# Patient Record
Sex: MALE
[De-identification: names, ages, dates, MRNs, and addresses within clinical notes are randomized per-mention and may not be internally consistent; named-entity substitution may affect disease eponyms.]

---

## 2024-07-19 ENCOUNTER — APPOINTMENT (OUTPATIENT)
Dept: GASTROENTEROLOGY | Facility: CLINIC | Age: 19
End: 2024-07-19
Payer: COMMERCIAL

## 2024-07-19 VITALS
DIASTOLIC BLOOD PRESSURE: 69 MMHG | WEIGHT: 114 LBS | OXYGEN SATURATION: 98 % | SYSTOLIC BLOOD PRESSURE: 103 MMHG | HEIGHT: 64 IN | RESPIRATION RATE: 14 BRPM | BODY MASS INDEX: 19.46 KG/M2 | HEART RATE: 88 BPM

## 2024-07-19 DIAGNOSIS — K51.90 ULCERATIVE COLITIS, UNSPECIFIED, W/OUT COMPLICATIONS: ICD-10-CM

## 2024-07-19 PROBLEM — Z00.00 ENCOUNTER FOR PREVENTIVE HEALTH EXAMINATION: Status: ACTIVE | Noted: 2024-07-19

## 2024-07-19 PROCEDURE — 99204 OFFICE O/P NEW MOD 45 MIN: CPT

## 2024-07-19 RX ORDER — PREDNISONE 10 MG/1
10 TABLET ORAL
Qty: 120 | Refills: 0 | Status: ACTIVE | COMMUNITY
Start: 2024-07-19 | End: 1900-01-01

## 2024-07-25 RX ADMIN — INFLIXIMAB 0 MG: 100 INJECTION, POWDER, LYOPHILIZED, FOR SOLUTION INTRAVENOUS at 00:00

## 2024-07-25 RX ADMIN — ACETAMINOPHEN 0 MG: 325 TABLET ORAL at 00:00

## 2024-07-25 RX ADMIN — CETIRIZINE HYDROCHLORIDE 1 MG: 10 TABLET, FILM COATED ORAL at 00:00

## 2024-07-30 ENCOUNTER — MED ADMIN CHARGE (OUTPATIENT)
Age: 19
End: 2024-07-30

## 2024-07-30 ENCOUNTER — APPOINTMENT (OUTPATIENT)
Dept: RHEUMATOLOGY | Facility: CLINIC | Age: 19
End: 2024-07-30
Payer: COMMERCIAL

## 2024-07-30 VITALS — OXYGEN SATURATION: 96 % | SYSTOLIC BLOOD PRESSURE: 94 MMHG | DIASTOLIC BLOOD PRESSURE: 58 MMHG | HEART RATE: 81 BPM

## 2024-07-30 VITALS
TEMPERATURE: 98 F | SYSTOLIC BLOOD PRESSURE: 127 MMHG | HEART RATE: 107 BPM | RESPIRATION RATE: 16 BRPM | DIASTOLIC BLOOD PRESSURE: 71 MMHG | WEIGHT: 114 LBS | OXYGEN SATURATION: 96 %

## 2024-07-30 PROCEDURE — 96413 CHEMO IV INFUSION 1 HR: CPT

## 2024-07-30 PROCEDURE — 96415 CHEMO IV INFUSION ADDL HR: CPT

## 2024-07-31 NOTE — HISTORY OF PRESENT ILLNESS
[Denies] : Denies [No] : No [N/A] : N/A [Yes] : Yes [Informed consent documented in EHR.] : Informed consent documented in EHR. [Left upper extremity] : Left upper extremity [24g] : 24g [Start Time: ___] : Medication Start Time: [unfilled] [End Time: ___] : Medication End Time: [unfilled] [Medication Name: ___] : Medication Name: [unfilled] [Total Amount Administered: ___] : Total Amount Administered: [unfilled] [IV discontinued. Intact. No signs or symptoms of IV complications noted. Time: ___] : IV discontinued. Intact. No signs or symptoms of IV complications noted. Time: [unfilled] [Patient  instructed to seek medical attention with signs and symptoms of adverse effects] : Patient  instructed to seek medical attention with signs and symptoms of adverse effects [Patient left unit in no acute distress] : Patient left unit in no acute distress [Medications administered as ordered and tolerated well.] : Medications administered as ordered and tolerated well. [de-identified] : FIRST DOSE [de-identified] : FOREARM [de-identified] : Patient presents for scheduled Remicade infusion, week 0, ambulatory in [facility], accompanied by mother. Patient with admits to recent diagnosis of UC, reporting 2-3 semi solid BM's during daylight hours and 7-9 BM's at nights. Patient adds abdominal pain and cramping with BM's at nights. Medication and consent reported as discussed with MD. Teaching and education reinforced with patient and mother, both verbalized understanding to all discussed.

## 2024-08-13 ENCOUNTER — APPOINTMENT (OUTPATIENT)
Dept: RHEUMATOLOGY | Facility: CLINIC | Age: 19
End: 2024-08-13
Payer: COMMERCIAL

## 2024-08-13 VITALS
TEMPERATURE: 98.2 F | OXYGEN SATURATION: 97 % | DIASTOLIC BLOOD PRESSURE: 70 MMHG | SYSTOLIC BLOOD PRESSURE: 108 MMHG | RESPIRATION RATE: 16 BRPM | HEART RATE: 88 BPM

## 2024-08-13 VITALS — DIASTOLIC BLOOD PRESSURE: 69 MMHG | HEART RATE: 89 BPM | SYSTOLIC BLOOD PRESSURE: 101 MMHG | OXYGEN SATURATION: 97 %

## 2024-08-13 DIAGNOSIS — K51.90 ULCERATIVE COLITIS, UNSPECIFIED, W/OUT COMPLICATIONS: ICD-10-CM

## 2024-08-13 PROCEDURE — 96415 CHEMO IV INFUSION ADDL HR: CPT

## 2024-08-13 PROCEDURE — 96413 CHEMO IV INFUSION 1 HR: CPT

## 2024-08-13 RX ORDER — CETIRIZINE HYDROCHLORIDE 10 MG/1
10 TABLET, FILM COATED ORAL
Qty: 0 | Refills: 0 | Status: COMPLETED | OUTPATIENT
Start: 2024-08-13

## 2024-08-13 RX ORDER — ACETAMINOPHEN 325 MG/1
325 TABLET ORAL
Qty: 0 | Refills: 0 | Status: COMPLETED | OUTPATIENT
Start: 2024-08-13

## 2024-08-13 RX ORDER — INFLIXIMAB 100 MG/10ML
100 INJECTION, POWDER, LYOPHILIZED, FOR SOLUTION INTRAVENOUS
Qty: 1 | Refills: 0 | Status: COMPLETED | OUTPATIENT
Start: 2024-08-13

## 2024-08-14 NOTE — HISTORY OF PRESENT ILLNESS
[N/A] : N/A [Denies] : Denies [No] : No [Yes] : Yes [Informed consent documented in EHR.] : Informed consent documented in EHR. [Left upper extremity] : Left upper extremity [24g] : 24g [Start Time: ___] : Medication Start Time: [unfilled] [End Time: ___] : Medication End Time: [unfilled] [Medication Name: ___] : Medication Name: [unfilled] [Total Amount Administered: ___] : Total Amount Administered: [unfilled] [IV discontinued. Intact. No signs or symptoms of IV complications noted. Time: ___] : IV discontinued. Intact. No signs or symptoms of IV complications noted. Time: [unfilled] [Patient  instructed to seek medical attention with signs and symptoms of adverse effects] : Patient  instructed to seek medical attention with signs and symptoms of adverse effects [Patient left unit in no acute distress] : Patient left unit in no acute distress [Medications administered as ordered and tolerated well.] : Medications administered as ordered and tolerated well. [de-identified] : FOREARM [de-identified] : Patient presents for scheduled Remicade infusion, week 2, ambulatory in [facility], accompanied by mother. Patient reports tolerating first dose well, with improvement in symptoms. Today patient admits to 3-5 semi soft BM's daily. Adds that he has had last 3 consecutive nights of uninterrupted sleep, with no BM's. Patient adds abdominal pain, and cramping has improved as well.

## 2024-08-14 NOTE — HISTORY OF PRESENT ILLNESS
[N/A] : N/A [Denies] : Denies [No] : No [Yes] : Yes [Informed consent documented in EHR.] : Informed consent documented in EHR. [Left upper extremity] : Left upper extremity [24g] : 24g [Start Time: ___] : Medication Start Time: [unfilled] [End Time: ___] : Medication End Time: [unfilled] [Medication Name: ___] : Medication Name: [unfilled] [Total Amount Administered: ___] : Total Amount Administered: [unfilled] [IV discontinued. Intact. No signs or symptoms of IV complications noted. Time: ___] : IV discontinued. Intact. No signs or symptoms of IV complications noted. Time: [unfilled] [Patient  instructed to seek medical attention with signs and symptoms of adverse effects] : Patient  instructed to seek medical attention with signs and symptoms of adverse effects [Patient left unit in no acute distress] : Patient left unit in no acute distress [Medications administered as ordered and tolerated well.] : Medications administered as ordered and tolerated well. [de-identified] : FOREARM [de-identified] : Patient presents for scheduled Remicade infusion, week 2, ambulatory in [facility], accompanied by mother. Patient reports tolerating first dose well, with improvement in symptoms. Today patient admits to 3-5 semi soft BM's daily. Adds that he has had last 3 consecutive nights of uninterrupted sleep, with no BM's. Patient adds abdominal pain, and cramping has improved as well.

## 2024-08-15 ENCOUNTER — RX RENEWAL (OUTPATIENT)
Age: 19
End: 2024-08-15

## 2024-09-13 ENCOUNTER — NON-APPOINTMENT (OUTPATIENT)
Age: 19
End: 2024-09-13

## 2024-09-14 ENCOUNTER — APPOINTMENT (OUTPATIENT)
Dept: RHEUMATOLOGY | Facility: CLINIC | Age: 19
End: 2024-09-14
Payer: COMMERCIAL

## 2024-09-14 VITALS
SYSTOLIC BLOOD PRESSURE: 88 MMHG | OXYGEN SATURATION: 98 % | RESPIRATION RATE: 16 BRPM | HEART RATE: 65 BPM | DIASTOLIC BLOOD PRESSURE: 59 MMHG

## 2024-09-14 VITALS
RESPIRATION RATE: 16 BRPM | TEMPERATURE: 98.3 F | HEART RATE: 73 BPM | DIASTOLIC BLOOD PRESSURE: 64 MMHG | SYSTOLIC BLOOD PRESSURE: 96 MMHG | OXYGEN SATURATION: 99 %

## 2024-09-14 DIAGNOSIS — K51.90 ULCERATIVE COLITIS, UNSPECIFIED, W/OUT COMPLICATIONS: ICD-10-CM

## 2024-09-14 PROCEDURE — 96413 CHEMO IV INFUSION 1 HR: CPT

## 2024-09-14 RX ORDER — CETIRIZINE HYDROCHLORIDE 10 MG/1
10 TABLET, FILM COATED ORAL
Qty: 0 | Refills: 0 | Status: COMPLETED | OUTPATIENT
Start: 2024-09-14

## 2024-09-14 RX ORDER — INFLIXIMAB 100 MG/10ML
100 INJECTION, POWDER, LYOPHILIZED, FOR SOLUTION INTRAVENOUS
Qty: 1 | Refills: 0 | Status: COMPLETED | OUTPATIENT
Start: 2024-09-14

## 2024-09-14 RX ORDER — ACETAMINOPHEN 325 MG/1
325 TABLET ORAL
Qty: 0 | Refills: 0 | Status: COMPLETED | OUTPATIENT
Start: 2024-09-14

## 2024-09-14 RX ADMIN — INFLIXIMAB 0 MG: 100 INJECTION, POWDER, LYOPHILIZED, FOR SOLUTION INTRAVENOUS at 00:00

## 2024-09-14 RX ADMIN — ACETAMINOPHEN 0 MG: 325 TABLET ORAL at 00:00

## 2024-09-14 RX ADMIN — LORATADINE 0 MG: 10 TABLET ORAL at 00:00

## 2024-09-14 NOTE — HISTORY OF PRESENT ILLNESS
[N/A] : N/A [Denies] : Denies [No] : No [Yes] : Yes [Declined] : Declined [Informed consent documented in EHR.] : Informed consent documented in EHR. [Left upper extremity] : Left upper extremity [24g] : 24g [Start Time: ___] : Medication Start Time: [unfilled] [End Time: ___] : Medication End Time: [unfilled] [Medication Name: ___] : Medication Name: [unfilled] [Total Amount Administered: ___] : Total Amount Administered: [unfilled] [IV discontinued. Intact. No signs or symptoms of IV complications noted. Time: ___] : IV discontinued. Intact. No signs or symptoms of IV complications noted. Time: [unfilled] [Patient  instructed to seek medical attention with signs and symptoms of adverse effects] : Patient  instructed to seek medical attention with signs and symptoms of adverse effects [Patient left unit in no acute distress] : Patient left unit in no acute distress [Medications administered as ordered and tolerated well.] : Medications administered as ordered and tolerated well. [de-identified] : FOREARM [de-identified] : Patient presents for scheduled Remicade infusion, week 6, doing well overall. Patient denies any recent infection, antibiotic use or hospitalizations. Patient admits to 2-5 semi soft BM's daily. Patient denies any abdominal pain, cramping or bloating. No other symptoms or concerns noted at this time. Patient pre-medicated and tolerated infusion well.

## 2024-09-20 ENCOUNTER — TRANSCRIPTION ENCOUNTER (OUTPATIENT)
Age: 19
End: 2024-09-20

## 2024-10-03 ENCOUNTER — APPOINTMENT (OUTPATIENT)
Dept: GASTROENTEROLOGY | Facility: CLINIC | Age: 19
End: 2024-10-03
Payer: COMMERCIAL

## 2024-10-03 VITALS
OXYGEN SATURATION: 99 % | BODY MASS INDEX: 19.63 KG/M2 | SYSTOLIC BLOOD PRESSURE: 90 MMHG | HEART RATE: 84 BPM | DIASTOLIC BLOOD PRESSURE: 60 MMHG | WEIGHT: 115 LBS | HEIGHT: 64 IN

## 2024-10-03 DIAGNOSIS — K51.90 ULCERATIVE COLITIS, UNSPECIFIED, W/OUT COMPLICATIONS: ICD-10-CM

## 2024-10-03 PROCEDURE — 99213 OFFICE O/P EST LOW 20 MIN: CPT

## 2024-10-03 PROCEDURE — G2211 COMPLEX E/M VISIT ADD ON: CPT | Mod: NC

## 2024-10-03 NOTE — HISTORY OF PRESENT ILLNESS
[FreeTextEntry1] : 19-year-old male Severe colitis Minimal response to corticosteroid Good initial response to infliximab Patient has completed induction Improved bowel habit, some slight weight gain No further bleeding Patient overall feels 75% improved  Still having up to 5 bowel movements a day Some straining, which is notably how his complaints began Improved stool formation however noted

## 2024-10-03 NOTE — REASON FOR VISIT
[Follow-up] : a follow-up of an existing diagnosis [Parent] : parent [FreeTextEntry1] : Ulcerative colitis

## 2024-10-03 NOTE — ASSESSMENT
[FreeTextEntry1] : Response but not remission following induction with infliximab Patient agreeable to blood work today including drug trough level with consideration to interval modification if needed Telephone follow-up next week.  Office visit again in 2 months.

## 2024-10-04 LAB
ALBUMIN SERPL ELPH-MCNC: 4.6 G/DL
ALP BLD-CCNC: 65 U/L
ALT SERPL-CCNC: 6 U/L
ANION GAP SERPL CALC-SCNC: 12 MMOL/L
AST SERPL-CCNC: 16 U/L
BILIRUB SERPL-MCNC: 0.2 MG/DL
BUN SERPL-MCNC: 16 MG/DL
CALCIUM SERPL-MCNC: 9.6 MG/DL
CHLORIDE SERPL-SCNC: 104 MMOL/L
CO2 SERPL-SCNC: 24 MMOL/L
CREAT SERPL-MCNC: 0.98 MG/DL
CRP SERPL-MCNC: <3 MG/L
EGFR: 113 ML/MIN/1.73M2
HCT VFR BLD CALC: 46.6 %
HGB BLD-MCNC: 15.4 G/DL
MCHC RBC-ENTMCNC: 30.1 PG
MCHC RBC-ENTMCNC: 33 GM/DL
MCV RBC AUTO: 91.2 FL
PLATELET # BLD AUTO: 403 K/UL
POTASSIUM SERPL-SCNC: 4.5 MMOL/L
PROT SERPL-MCNC: 7.7 G/DL
RBC # BLD: 5.11 M/UL
RBC # FLD: 11.7 %
SODIUM SERPL-SCNC: 141 MMOL/L
WBC # FLD AUTO: 7.94 K/UL

## 2024-10-18 DIAGNOSIS — K51.90 ULCERATIVE COLITIS, UNSPECIFIED, W/OUT COMPLICATIONS: ICD-10-CM

## 2024-10-24 ENCOUNTER — NON-APPOINTMENT (OUTPATIENT)
Age: 19
End: 2024-10-24

## 2024-10-24 RX ORDER — INFLIXIMAB 100 MG/10ML
100 INJECTION, POWDER, LYOPHILIZED, FOR SOLUTION INTRAVENOUS
Refills: 0 | Status: ACTIVE | OUTPATIENT
Start: 2024-10-24 | End: 1900-01-01

## 2024-10-28 ENCOUNTER — EMERGENCY (EMERGENCY)
Facility: HOSPITAL | Age: 19
LOS: 1 days | End: 2024-10-28
Attending: STUDENT IN AN ORGANIZED HEALTH CARE EDUCATION/TRAINING PROGRAM
Payer: COMMERCIAL

## 2024-10-28 ENCOUNTER — NON-APPOINTMENT (OUTPATIENT)
Age: 19
End: 2024-10-28

## 2024-10-28 VITALS
HEIGHT: 64 IN | WEIGHT: 110.01 LBS | DIASTOLIC BLOOD PRESSURE: 77 MMHG | TEMPERATURE: 98 F | SYSTOLIC BLOOD PRESSURE: 119 MMHG | HEART RATE: 90 BPM | RESPIRATION RATE: 16 BRPM | OXYGEN SATURATION: 97 %

## 2024-10-28 LAB
ALBUMIN SERPL ELPH-MCNC: 4.2 G/DL — SIGNIFICANT CHANGE UP (ref 3.3–5)
ALP SERPL-CCNC: 76 U/L — SIGNIFICANT CHANGE UP (ref 40–120)
ALT FLD-CCNC: <5 U/L — LOW (ref 10–45)
ANION GAP SERPL CALC-SCNC: 12 MMOL/L — SIGNIFICANT CHANGE UP (ref 5–17)
AST SERPL-CCNC: 13 U/L — SIGNIFICANT CHANGE UP (ref 10–40)
BILIRUB SERPL-MCNC: 0.4 MG/DL — SIGNIFICANT CHANGE UP (ref 0.2–1.2)
BUN SERPL-MCNC: 12 MG/DL — SIGNIFICANT CHANGE UP (ref 7–23)
CALCIUM SERPL-MCNC: 9.2 MG/DL — SIGNIFICANT CHANGE UP (ref 8.4–10.5)
CHLORIDE SERPL-SCNC: 103 MMOL/L — SIGNIFICANT CHANGE UP (ref 96–108)
CO2 SERPL-SCNC: 23 MMOL/L — SIGNIFICANT CHANGE UP (ref 22–31)
CREAT SERPL-MCNC: 0.99 MG/DL — SIGNIFICANT CHANGE UP (ref 0.5–1.3)
CRP SERPL-MCNC: 27 MG/L — HIGH (ref 0–4)
EGFR: 112 ML/MIN/1.73M2 — SIGNIFICANT CHANGE UP
GLUCOSE SERPL-MCNC: 87 MG/DL — SIGNIFICANT CHANGE UP (ref 70–99)
HCT VFR BLD CALC: 45.1 % — SIGNIFICANT CHANGE UP (ref 39–50)
HGB BLD-MCNC: 15 G/DL — SIGNIFICANT CHANGE UP (ref 13–17)
MCHC RBC-ENTMCNC: 29.4 PG — SIGNIFICANT CHANGE UP (ref 27–34)
MCHC RBC-ENTMCNC: 33.3 GM/DL — SIGNIFICANT CHANGE UP (ref 32–36)
MCV RBC AUTO: 88.4 FL — SIGNIFICANT CHANGE UP (ref 80–100)
NRBC # BLD: 0 /100 WBCS — SIGNIFICANT CHANGE UP (ref 0–0)
PLATELET # BLD AUTO: 426 K/UL — HIGH (ref 150–400)
POTASSIUM SERPL-MCNC: 3.9 MMOL/L — SIGNIFICANT CHANGE UP (ref 3.5–5.3)
POTASSIUM SERPL-SCNC: 3.9 MMOL/L — SIGNIFICANT CHANGE UP (ref 3.5–5.3)
PROT SERPL-MCNC: 7.6 G/DL — SIGNIFICANT CHANGE UP (ref 6–8.3)
RBC # BLD: 5.1 M/UL — SIGNIFICANT CHANGE UP (ref 4.2–5.8)
RBC # FLD: 11.7 % — SIGNIFICANT CHANGE UP (ref 10.3–14.5)
SODIUM SERPL-SCNC: 138 MMOL/L — SIGNIFICANT CHANGE UP (ref 135–145)
WBC # BLD: 11.96 K/UL — HIGH (ref 3.8–10.5)
WBC # FLD AUTO: 11.96 K/UL — HIGH (ref 3.8–10.5)

## 2024-10-28 PROCEDURE — 82397 CHEMILUMINESCENT ASSAY: CPT

## 2024-10-28 PROCEDURE — 99285 EMERGENCY DEPT VISIT HI MDM: CPT

## 2024-10-28 PROCEDURE — 86140 C-REACTIVE PROTEIN: CPT

## 2024-10-28 PROCEDURE — 80053 COMPREHEN METABOLIC PANEL: CPT

## 2024-10-28 PROCEDURE — 80230 DRUG ASSAY INFLIXIMAB: CPT

## 2024-10-28 PROCEDURE — 85027 COMPLETE CBC AUTOMATED: CPT

## 2024-10-28 PROCEDURE — 96374 THER/PROPH/DIAG INJ IV PUSH: CPT

## 2024-10-28 PROCEDURE — 99284 EMERGENCY DEPT VISIT MOD MDM: CPT | Mod: 25

## 2024-10-28 PROCEDURE — 85652 RBC SED RATE AUTOMATED: CPT

## 2024-10-28 PROCEDURE — 99283 EMERGENCY DEPT VISIT LOW MDM: CPT | Mod: GC

## 2024-10-28 RX ORDER — SODIUM CHLORIDE 0.9 % (FLUSH) 0.9 %
1000 SYRINGE (ML) INJECTION ONCE
Refills: 0 | Status: COMPLETED | OUTPATIENT
Start: 2024-10-28 | End: 2024-10-28

## 2024-10-28 RX ORDER — INFLIXIMAB-DYYB 120 MG/ML
500 INJECTION SUBCUTANEOUS ONCE
Refills: 0 | Status: COMPLETED | OUTPATIENT
Start: 2024-10-28 | End: 2024-10-28

## 2024-10-28 RX ADMIN — INFLIXIMAB-DYYB 125 MILLIGRAM(S): 120 INJECTION SUBCUTANEOUS at 21:41

## 2024-10-28 RX ADMIN — Medication 1000 MILLILITER(S): at 21:12

## 2024-10-28 NOTE — ED PROVIDER NOTE - CLINICAL SUMMARY MEDICAL DECISION MAKING FREE TEXT BOX
19-year-old male recently diagnosed with ulcerative colitis, presents to the ED complaining of 2 to 3 weeks of increased volume of stool and generalized weakness. Patient reports bloody stools occurring frequently up to 10 times per day for the past 2 weeks.  Patient denies fevers, chills, recent travel, strange or abnormal foods, antibiotic use.  Patient denies sick contacts with similar symptoms.  Patient currently on infliximab injections. Was taken off prednisone last week. Denies fever, chills, chest pain, shortness of breath, abdominal pain, n/v, dysuria.    Physical Exam:  Gen: NAD, awake and alert, non-toxic appearing  HEENT: Atraumatic, oropharynx clear, moist mucous membranes, normal conjunctiva  Cardio: RRR, no murmurs, rubs or gallops  Lung: CTAB, no respiratory distress, no wheezes/rhonchi/rales B/L  Abd: soft, NT, ND, no guarding, no rigidity, no rebound tenderness  MSK: no visible deformities, ROMx4   Neuro: No focal sensory or motor deficits  Skin: Warm, well perfused, no rash, no leg swelling     Patient presents with bloody diarrhea in setting of ulcerative colitis.  No abdominal pain And exam is nontender, low suspicion for intra-abdominal infectious process. Vitals within normal limits, nontoxic-appearing.  Considering dehydration, EZEQUIEL, ulcerative colitis exacerbation.  CBC, CMP To assess electrolyte abnormality.  Will consult GI  GI saw and evaluated patient, recommend infusion of apixaban here in the emergency department can be safely discharged with GI follow-up in 2 days

## 2024-10-28 NOTE — CONSULT NOTE ADULT - SUBJECTIVE AND OBJECTIVE BOX
Chief Complaint:  Patient is a 19y old  Male who presents with a chief complaint of diarrhea.     HPI:  Mr. Grissom is a 19 year old male, recently diagnosed ulcerative colitis disease, presenting with two weeks or worsening diarrhea and weight loss. He has been receiving infliximab infusions for his Crohn's, and had his third infusion at the beginning of October. Bowel movements were well controlled prior to the past two weeks, with 1-2 formed BM/day. For the past two weeks has been having many watery bloody stools daily. Denies any recent travel, sick contacts. No fevers, rashes, eye complaints, nausea, vomiting. Occasional abdominal pain/ cramping (typically when having a bm). Was away at [facility] in [place] when this started, came home due to worsening diarrhea and weight loss.   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 Prescribed budesonide 9mg, mesalamine 1.5g, and pantoprazole 20mg. Was started on steroid taper at that time, and also had first infliximab infusion. Says prednisone helped a lot with symptoms, most recent steroid course finished at the end of september.   ?     Allergies:  amoxicillin (Hives)      Home Medications:    Hospital Medications:      PMHX/PSHX:      Family history:      Denies family history of colon cancer/polyps, stomach cancer/polyps, pancreatic cancer/masses, liver cancer/disease, ovarian cancer and endometrial cancer.    Social History:   Tob: Denies  EtOH: Denies  Illicit Drugs: Denies    ROS:     General:  No wt loss, fevers, chills, night sweats, fatigue  Eyes:  Good vision, no reported pain  ENT:  No sore throat, pain, runny nose, dysphagia  CV:  No pain, palpitations, hypo/hypertension  Pulm:  No dyspnea, cough, tachypnea, wheezing  GI:  see HPI  :  No pain, bleeding, incontinence, nocturia  Muscle:  No pain, weakness  Neuro:  No weakness, tingling, memory problems  Psych:  No fatigue, insomnia, mood problems, depression  Endocrine:  No polyuria, polydipsia, cold/heat intolerance  Heme:  No petechiae, ecchymosis, easy bruisability  Skin:  No rash, tattoos, scars, edema    PHYSICAL EXAM:     GENERAL:  No acute distress  HEENT:  NCAT, no scleral icterus   CHEST:  no respiratory distress  HEART:  Regular rate and rhythm  ABDOMEN:  Soft, non-tender, non-distended, normoactive bowel sounds,  no masses, no hepato-splenomegaly, no signs of chronic liver disease  EXTREMITIES: No edema  SKIN:  No rash/erythema/ecchymoses/petechiae/wounds/abscess/warm/dry  NEURO:  Alert and oriented x 3, no asterixis    Vital Signs:  Vital Signs Last 24 Hrs  T(C): 36.8 (28 Oct 2024 14:22), Max: 36.8 (28 Oct 2024 14:22)  T(F): 98.2 (28 Oct 2024 14:22), Max: 98.2 (28 Oct 2024 14:22)  HR: 90 (28 Oct 2024 14:22) (90 - 90)  BP: 119/77 (28 Oct 2024 14:22) (119/77 - 119/77)  BP(mean): --  RR: 16 (28 Oct 2024 14:22) (16 - 16)  SpO2: 97% (28 Oct 2024 14:22) (97% - 97%)    Parameters below as of 28 Oct 2024 14:22  Patient On (Oxygen Delivery Method): room air      Daily Height in cm: 162.56 (28 Oct 2024 14:22)    Daily     LABS:                        Imaging:           Chief Complaint:  Patient is a 19y old  Male who presents with a chief complaint of diarrhea.     HPI:  Mr. Grissom is a 19 year old male, recently diagnosed ulcerative colitis disease, presenting with two weeks or worsening diarrhea and weight loss. He has been receiving infliximab infusions for his Crohn's, and had his third infusion at the beginning of October. Bowel movements were well controlled prior to the past two weeks, with 1-2 formed BM/day. For the past two weeks has been having many watery bloody stools daily. Denies any recent travel, sick contacts. No fevers, rashes, eye complaints, nausea, vomiting. Occasional abdominal pain/ cramping (typically when having a bm). Was away at college in [place] when this started, came home due to worsening diarrhea and weight loss. No joint pain/skin lesions/eye pain/fever. No sick contacts. He has recently received 3 doses of infliximab induction, last was beginning of October this month. He reports some improvement in symptoms, but in the last 2 weeks symptoms have returned.  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 Prescribed budesonide 9mg, mesalamine 1.5g, and pantoprazole 20mg. Was started on steroid taper at that time, and also had first infliximab infusion. Says prednisone helped a lot with symptoms, most recent steroid course finished at the end of september.   ?     Allergies:  amoxicillin (Hives)      Home Medications:    Hospital Medications:      PMHX/PSHX:      Family history:      Denies family history of colon cancer/polyps, stomach cancer/polyps, pancreatic cancer/masses, liver cancer/disease, ovarian cancer and endometrial cancer.    Social History:   Tob: Denies  EtOH: Denies  Illicit Drugs: Denies    ROS:     General:  No wt loss, fevers, chills, night sweats, fatigue  Eyes:  Good vision, no reported pain  ENT:  No sore throat, pain, runny nose, dysphagia  CV:  No pain, palpitations, hypo/hypertension  Pulm:  No dyspnea, cough, tachypnea, wheezing  GI:  see HPI  :  No pain, bleeding, incontinence, nocturia  Muscle:  No pain, weakness  Neuro:  No weakness, tingling, memory problems  Psych:  No fatigue, insomnia, mood problems, depression  Endocrine:  No polyuria, polydipsia, cold/heat intolerance  Heme:  No petechiae, ecchymosis, easy bruisability  Skin:  No rash, tattoos, scars, edema    PHYSICAL EXAM:     GENERAL:  No acute distress  HEENT:  NCAT, no scleral icterus   CHEST:  no respiratory distress  HEART:  Regular rate and rhythm  ABDOMEN:  Soft, non-tender, non-distended, normoactive bowel sounds,  no masses, no hepato-splenomegaly, no signs of chronic liver disease  EXTREMITIES: No edema  SKIN:  No rash/erythema/ecchymoses/petechiae/wounds/abscess/warm/dry  NEURO:  Alert and oriented x 3, no asterixis    Vital Signs:  Vital Signs Last 24 Hrs  T(C): 36.8 (28 Oct 2024 14:22), Max: 36.8 (28 Oct 2024 14:22)  T(F): 98.2 (28 Oct 2024 14:22), Max: 98.2 (28 Oct 2024 14:22)  HR: 90 (28 Oct 2024 14:22) (90 - 90)  BP: 119/77 (28 Oct 2024 14:22) (119/77 - 119/77)  BP(mean): --  RR: 16 (28 Oct 2024 14:22) (16 - 16)  SpO2: 97% (28 Oct 2024 14:22) (97% - 97%)    Parameters below as of 28 Oct 2024 14:22  Patient On (Oxygen Delivery Method): room air      Daily Height in cm: 162.56 (28 Oct 2024 14:22)    Daily     LABS:                        Imaging:

## 2024-10-28 NOTE — ED PROVIDER NOTE - PATIENT PORTAL LINK FT
You can access the FollowMyHealth Patient Portal offered by [facility] by registering at the following website: [email or web address]. By joining [facility]’s FollowMyHealth portal, you will also be able to view your health information using other applications (apps) compatible with our system.

## 2024-10-28 NOTE — CONSULT NOTE ADULT - ATTENDING COMMENTS
Agree with above. Patient with severe UC, has had 3 doses of induction infliximab, but still with diarrhea and pain. Suspect non-responding colitis to infliximab, but need to rule out C diff. Would draw infliximab drug level, check C diff, and admit patient to hospital. Will give additional accelerated dose of infliximab at 10mg/kg as soon as patient is admitted. Risks of biologic therapy, including that of allergic reactions, malignancy, infections explained, patient is agreeable to proceed with accelerated infliximab dose. D/w Dr. Garcia, patient's outpatient gastroenterologist.

## 2024-10-28 NOTE — ED PROVIDER NOTE - RAPID ASSESSMENT
19-year-old male recently diagnosed with ulcerative colitis presents to the ED complaining of 2 to 3 weeks of increased volume of stool.  Patient reports bloody stools occurring frequently up to 10 times per day for the past 2 weeks.  Patient denies fevers, chills, recent travel, strange or abnormal foods, antibiotic use.  Patient denies sick contacts with similar symptoms.  Patient currently on infliximab.    A GI consult note was appreciated requesting GI PCR and stool C. difficile be sent.  Given these orders, the case was discussed with charge RN and decision was made to place patient into isolation room in the interim.    Patient was seen as a rapid assessment (QPA) patient. This is an incomplete workup and it is intended that the patient will be seen in the main emergency department. The patient will be seen and further worked up in the main emergency department and their care will be completed by the main emergency department team along with a thorough physical exam. Receiving team will follow up on labs, analgesia, any clinical imaging, reassess and disposition as clinically indicated, all decisions regarding the progression of care will be made at their discretion. - Bo Roe PA-C 19-year-old male recently diagnosed with ulcerative colitis presents to the ED complaining of 2 to 3 weeks of increased volume of stool.  Patient reports bloody stools occurring frequently up to 10 times per day for the past 2 weeks.  Patient denies fevers, chills, recent travel, strange or abnormal foods, antibiotic use.  Patient denies sick contacts with similar symptoms.  Patient currently on infliximab.    A GI consult note was appreciated requesting GI PCR and stool C. difficile be sent.  Given these orders, the case was discussed with charge RN and decision was made to place patient into isolation room in the interim.    Patient was seen as a rapid assessment (QPA) patient. This is an incomplete workup and it is intended that the patient will be seen in the main emergency department. The patient will be seen and further worked up in the main emergency department and their care will be completed by the main emergency department team along with a thorough physical exam. Receiving team will follow up on labs, analgesia, any clinical imaging, reassess and disposition as clinically indicated, all decisions regarding the progression of care will be made at their discretion. - Bo Roe PA-C    Attending MD Banks: This patient was seen and orders were placed by the PA as per our department's QPA model.  I was not consulted in regards to this patient although I was present and available in the Emergency Department to the PA.  Patient was to be sent to main ED for full medical evaluation and receiving team was to follow up on any labs, analgesia, clinical imaging ordered by the PA.  Any reassessment and disposition decisions were to be made by receiving team as clinically indicated, all decisions regarding the progression of care to be made at their discretion.  I did not perform a comprehensive history and physical on this patient.

## 2024-10-28 NOTE — CONSULT NOTE ADULT - ASSESSMENT
19 year old male, PMH newly diagnosed UC, presenting with two weeks of worsening diarrhea and weight loss. Will need to rule out infectious etiologies causing a UC flare.     #Ulcerative colitis   Recommendations:    -please obtain Cdiff, GI PCR, fecal calprotectin   -obtain CBC, CMP, ESR, CRP, infliximab trough level   -will give infliximab 10mg/kg dose (500mg)     Note incomplete until finalized by attending signature/attestation.    Kimberli Banks  GI/Hepatology Fellow    MONDAY-FRIDAY 8AM-5PM:  Pager# [phone] ([facility]) or [phone] ([facility])    NON-URGENT CONSULTS:  Please email [email or web address]  AT NIGHT AND ON WEEKENDS:  Contact on-call GI fellow from 5pm-8am and on weekends/holidays   19 year old male, PMH newly diagnosed UC, presenting with two weeks of worsening diarrhea and weight loss. Will need to rule out infectious etiologies causing a UC flare.     #Ulcerative colitis     Recommendations:    -please obtain Cdiff, GI PCR, fecal calprotectin   -obtain CBC, CMP, ESR, CRP, infliximab trough level   -will give infliximab 10mg/kg dose (500mg x 1) with Tylenol and Benadryl x 1 as pre-medication   -D/w Dr. Garcia    Note incomplete until finalized by attending signature/attestation.    Kimberli Banks  GI/Hepatology Fellow    MONDAY-FRIDAY 8AM-5PM:  Pager# [phone] ([facility]) or [phone] ([facility])    NON-URGENT CONSULTS:  Please email [email or web address]  AT NIGHT AND ON WEEKENDS:  Contact on-call GI fellow from 5pm-8am and on weekends/holidays

## 2024-10-28 NOTE — ED PROVIDER NOTE - NSFOLLOWUPINSTRUCTIONS_ED_ALL_ED_FT
You were evaluated in the emergency department for diarrhea.  Gastroenterology saw and evaluated you and recommended infliximab infusion here.  Your labs were unremarkable here.    Please follow-up with your gastroenterologist within the next week.    Return to the emergency department for any new or concerning symptoms

## 2024-10-28 NOTE — ED PROVIDER NOTE - PROGRESS NOTE DETAILS
Dr. Rosey Yost, ATTENDING: Reassessed patient at bedside.  Vitals within normal limits.  Infusion complete. Patient tolerating p.o. and would like to go home.  Strict return precaution discussed and all questions answered

## 2024-10-28 NOTE — ED ADULT NURSE NOTE - OBJECTIVE STATEMENT
19 year old male presented to ED with c/o of frequent bowel movements x3 weeks s/p diagnosis of ulcerative colitis. pt denies CP, SOB, nausea/vomiting, numbness/tingling, fever, cough, chills, dizziness, headache, blurred vision, neuro intact. pt a&ox3, lung sounds clear, heart rate regular, abdomen soft nontender nondistended to palp. skin intact. IV in RAC 20G and patent. Will continue to monitor and assess while offering support and reassurance.

## 2024-10-29 VITALS
OXYGEN SATURATION: 98 % | HEART RATE: 75 BPM | RESPIRATION RATE: 18 BRPM | DIASTOLIC BLOOD PRESSURE: 62 MMHG | TEMPERATURE: 98 F | SYSTOLIC BLOOD PRESSURE: 114 MMHG

## 2024-10-29 LAB — ERYTHROCYTE [SEDIMENTATION RATE] IN BLOOD: 25 MM/HR — HIGH (ref 0–15)

## 2024-11-08 ENCOUNTER — APPOINTMENT (OUTPATIENT)
Dept: RHEUMATOLOGY | Facility: CLINIC | Age: 19
End: 2024-11-08

## 2024-11-26 ENCOUNTER — APPOINTMENT (OUTPATIENT)
Dept: GASTROENTEROLOGY | Facility: CLINIC | Age: 19
End: 2024-11-26
Payer: COMMERCIAL

## 2024-11-26 VITALS
HEIGHT: 64 IN | DIASTOLIC BLOOD PRESSURE: 56 MMHG | HEART RATE: 82 BPM | SYSTOLIC BLOOD PRESSURE: 110 MMHG | BODY MASS INDEX: 18.44 KG/M2 | WEIGHT: 108 LBS | OXYGEN SATURATION: 97 %

## 2024-11-26 DIAGNOSIS — K51.90 ULCERATIVE COLITIS, UNSPECIFIED, W/OUT COMPLICATIONS: ICD-10-CM

## 2024-11-26 PROCEDURE — 99213 OFFICE O/P EST LOW 20 MIN: CPT

## 2024-11-26 PROCEDURE — G2211 COMPLEX E/M VISIT ADD ON: CPT | Mod: NC

## 2024-11-26 RX ORDER — UPADACITINIB 45 MG/1
45 TABLET, EXTENDED RELEASE ORAL
Qty: 1 | Refills: 1 | Status: ACTIVE | COMMUNITY
Start: 2024-11-26 | End: 1900-01-01

## 2024-11-27 ENCOUNTER — APPOINTMENT (OUTPATIENT)
Dept: COLORECTAL SURGERY | Facility: CLINIC | Age: 19
End: 2024-11-27

## 2024-12-09 ENCOUNTER — TRANSCRIPTION ENCOUNTER (OUTPATIENT)
Age: 19
End: 2024-12-09

## 2024-12-09 ENCOUNTER — NON-APPOINTMENT (OUTPATIENT)
Age: 19
End: 2024-12-09

## 2024-12-10 ENCOUNTER — NON-APPOINTMENT (OUTPATIENT)
Age: 19
End: 2024-12-10

## 2025-01-03 ENCOUNTER — APPOINTMENT (OUTPATIENT)
Dept: RHEUMATOLOGY | Facility: CLINIC | Age: 20
End: 2025-01-03

## 2025-01-10 ENCOUNTER — APPOINTMENT (OUTPATIENT)
Dept: GASTROENTEROLOGY | Facility: CLINIC | Age: 20
End: 2025-01-10
Payer: COMMERCIAL

## 2025-01-10 VITALS
HEIGHT: 64 IN | DIASTOLIC BLOOD PRESSURE: 71 MMHG | SYSTOLIC BLOOD PRESSURE: 103 MMHG | OXYGEN SATURATION: 98 % | BODY MASS INDEX: 20.49 KG/M2 | WEIGHT: 120 LBS | HEART RATE: 91 BPM

## 2025-01-10 DIAGNOSIS — K51.90 ULCERATIVE COLITIS, UNSPECIFIED, W/OUT COMPLICATIONS: ICD-10-CM

## 2025-01-10 LAB
ALBUMIN SERPL ELPH-MCNC: 4.8 G/DL
ALP BLD-CCNC: 67 U/L
ALT SERPL-CCNC: 11 U/L
ANION GAP SERPL CALC-SCNC: 11 MMOL/L
AST SERPL-CCNC: 22 U/L
BILIRUB SERPL-MCNC: 0.3 MG/DL
BUN SERPL-MCNC: 18 MG/DL
CALCIUM SERPL-MCNC: 9.6 MG/DL
CHLORIDE SERPL-SCNC: 106 MMOL/L
CO2 SERPL-SCNC: 24 MMOL/L
CREAT SERPL-MCNC: 0.97 MG/DL
EGFR: 115 ML/MIN/1.73M2
HCT VFR BLD CALC: 42.2 %
HGB BLD-MCNC: 14.5 G/DL
MCHC RBC-ENTMCNC: 30.2 PG
MCHC RBC-ENTMCNC: 34.4 G/DL
MCV RBC AUTO: 87.9 FL
PLATELET # BLD AUTO: 379 K/UL
POTASSIUM SERPL-SCNC: 4.2 MMOL/L
PROT SERPL-MCNC: 7.7 G/DL
RBC # BLD: 4.8 M/UL
RBC # FLD: 12.5 %
SODIUM SERPL-SCNC: 141 MMOL/L
WBC # FLD AUTO: 5.37 K/UL

## 2025-01-10 PROCEDURE — 99213 OFFICE O/P EST LOW 20 MIN: CPT

## 2025-01-10 PROCEDURE — G2211 COMPLEX E/M VISIT ADD ON: CPT | Mod: NC

## 2025-03-13 ENCOUNTER — NON-APPOINTMENT (OUTPATIENT)
Age: 20
End: 2025-03-13

## 2025-03-21 ENCOUNTER — RESULT REVIEW (OUTPATIENT)
Age: 20
End: 2025-03-21

## 2025-03-21 ENCOUNTER — TRANSCRIPTION ENCOUNTER (OUTPATIENT)
Age: 20
End: 2025-03-21

## 2025-03-21 ENCOUNTER — OUTPATIENT (OUTPATIENT)
Dept: EMERGENCY DEPT | Facility: HOSPITAL | Age: 20
LOS: 1 days | Discharge: HOME | End: 2025-03-21
Payer: COMMERCIAL

## 2025-03-21 VITALS
DIASTOLIC BLOOD PRESSURE: 86 MMHG | TEMPERATURE: 98 F | OXYGEN SATURATION: 98 % | SYSTOLIC BLOOD PRESSURE: 122 MMHG | RESPIRATION RATE: 13 BRPM | HEART RATE: 88 BPM

## 2025-03-21 VITALS
OXYGEN SATURATION: 98 % | WEIGHT: 127.21 LBS | DIASTOLIC BLOOD PRESSURE: 97 MMHG | HEART RATE: 94 BPM | RESPIRATION RATE: 18 BRPM | SYSTOLIC BLOOD PRESSURE: 131 MMHG | TEMPERATURE: 98 F

## 2025-03-21 DIAGNOSIS — K35.80 UNSPECIFIED ACUTE APPENDICITIS: ICD-10-CM

## 2025-03-21 LAB
ABO RH CONFIRMATION: SIGNIFICANT CHANGE UP
ALBUMIN SERPL ELPH-MCNC: 3.9 G/DL — SIGNIFICANT CHANGE UP (ref 3.3–5)
ALBUMIN SERPL ELPH-MCNC: 3.9 G/DL — SIGNIFICANT CHANGE UP (ref 3.3–5)
ALP SERPL-CCNC: 61 U/L — SIGNIFICANT CHANGE UP (ref 40–120)
ALP SERPL-CCNC: 63 U/L — SIGNIFICANT CHANGE UP (ref 40–120)
ALT FLD-CCNC: 19 U/L — SIGNIFICANT CHANGE UP (ref 12–78)
ALT FLD-CCNC: 22 U/L — SIGNIFICANT CHANGE UP (ref 12–78)
ANION GAP SERPL CALC-SCNC: 3 MMOL/L — LOW (ref 5–17)
ANION GAP SERPL CALC-SCNC: 5 MMOL/L — SIGNIFICANT CHANGE UP (ref 5–17)
APTT BLD: 31 SEC — SIGNIFICANT CHANGE UP (ref 24.5–35.6)
AST SERPL-CCNC: 19 U/L — SIGNIFICANT CHANGE UP (ref 15–37)
AST SERPL-CCNC: 20 U/L — SIGNIFICANT CHANGE UP (ref 15–37)
BASOPHILS # BLD AUTO: 0.02 K/UL — SIGNIFICANT CHANGE UP (ref 0–0.2)
BASOPHILS NFR BLD AUTO: 0.2 % — SIGNIFICANT CHANGE UP (ref 0–2)
BILIRUB SERPL-MCNC: 0.5 MG/DL — SIGNIFICANT CHANGE UP (ref 0.2–1.2)
BILIRUB SERPL-MCNC: 0.5 MG/DL — SIGNIFICANT CHANGE UP (ref 0.2–1.2)
BUN SERPL-MCNC: 10 MG/DL — SIGNIFICANT CHANGE UP (ref 7–23)
BUN SERPL-MCNC: 9 MG/DL — SIGNIFICANT CHANGE UP (ref 7–23)
CALCIUM SERPL-MCNC: 9.1 MG/DL — SIGNIFICANT CHANGE UP (ref 8.5–10.1)
CALCIUM SERPL-MCNC: 9.2 MG/DL — SIGNIFICANT CHANGE UP (ref 8.5–10.1)
CHLORIDE SERPL-SCNC: 103 MMOL/L — SIGNIFICANT CHANGE UP (ref 96–108)
CHLORIDE SERPL-SCNC: 104 MMOL/L — SIGNIFICANT CHANGE UP (ref 96–108)
CO2 SERPL-SCNC: 25 MMOL/L — SIGNIFICANT CHANGE UP (ref 22–31)
CO2 SERPL-SCNC: 26 MMOL/L — SIGNIFICANT CHANGE UP (ref 22–31)
CREAT SERPL-MCNC: 0.83 MG/DL — SIGNIFICANT CHANGE UP (ref 0.5–1.3)
CREAT SERPL-MCNC: 0.95 MG/DL — SIGNIFICANT CHANGE UP (ref 0.5–1.3)
EGFR: 118 ML/MIN/1.73M2 — SIGNIFICANT CHANGE UP
EGFR: 118 ML/MIN/1.73M2 — SIGNIFICANT CHANGE UP
EGFR: 128 ML/MIN/1.73M2 — SIGNIFICANT CHANGE UP
EGFR: 128 ML/MIN/1.73M2 — SIGNIFICANT CHANGE UP
EOSINOPHIL # BLD AUTO: 0.01 K/UL — SIGNIFICANT CHANGE UP (ref 0–0.5)
EOSINOPHIL NFR BLD AUTO: 0.1 % — SIGNIFICANT CHANGE UP (ref 0–6)
GLUCOSE SERPL-MCNC: 104 MG/DL — HIGH (ref 70–99)
GLUCOSE SERPL-MCNC: 93 MG/DL — SIGNIFICANT CHANGE UP (ref 70–99)
HCT VFR BLD CALC: 41.1 % — SIGNIFICANT CHANGE UP (ref 39–50)
HCT VFR BLD CALC: 41.4 % — SIGNIFICANT CHANGE UP (ref 39–50)
HGB BLD-MCNC: 14 G/DL — SIGNIFICANT CHANGE UP (ref 13–17)
HGB BLD-MCNC: 14.5 G/DL — SIGNIFICANT CHANGE UP (ref 13–17)
IMM GRANULOCYTES # BLD AUTO: 0.04 K/UL — SIGNIFICANT CHANGE UP (ref 0–0.07)
IMM GRANULOCYTES NFR BLD AUTO: 0.4 % — SIGNIFICANT CHANGE UP (ref 0–0.9)
INR BLD: 1.25 RATIO — HIGH (ref 0.85–1.16)
LIDOCAIN IGE QN: 48 U/L — SIGNIFICANT CHANGE UP (ref 13–75)
LYMPHOCYTES # BLD AUTO: 1.8 K/UL — SIGNIFICANT CHANGE UP (ref 1–3.3)
LYMPHOCYTES NFR BLD AUTO: 16.3 % — SIGNIFICANT CHANGE UP (ref 13–44)
MCHC RBC-ENTMCNC: 31.3 PG — SIGNIFICANT CHANGE UP (ref 27–34)
MCHC RBC-ENTMCNC: 32 PG — SIGNIFICANT CHANGE UP (ref 27–34)
MCHC RBC-ENTMCNC: 34.1 G/DL — SIGNIFICANT CHANGE UP (ref 32–36)
MCHC RBC-ENTMCNC: 35 G/DL — SIGNIFICANT CHANGE UP (ref 32–36)
MCV RBC AUTO: 91.4 FL — SIGNIFICANT CHANGE UP (ref 80–100)
MCV RBC AUTO: 91.7 FL — SIGNIFICANT CHANGE UP (ref 80–100)
MONOCYTES # BLD AUTO: 0.52 K/UL — SIGNIFICANT CHANGE UP (ref 0–0.9)
MONOCYTES NFR BLD AUTO: 4.7 % — SIGNIFICANT CHANGE UP (ref 2–14)
NEUTROPHILS # BLD AUTO: 8.66 K/UL — HIGH (ref 1.8–7.4)
NEUTROPHILS NFR BLD AUTO: 78.3 % — HIGH (ref 43–77)
NRBC # BLD AUTO: 0 K/UL — SIGNIFICANT CHANGE UP (ref 0–0)
NRBC # BLD AUTO: 0 K/UL — SIGNIFICANT CHANGE UP (ref 0–0)
NRBC # FLD: 0 K/UL — SIGNIFICANT CHANGE UP (ref 0–0)
NRBC # FLD: 0 K/UL — SIGNIFICANT CHANGE UP (ref 0–0)
NRBC BLD AUTO-RTO: 0 /100 WBCS — SIGNIFICANT CHANGE UP (ref 0–0)
NRBC BLD AUTO-RTO: 0 /100 WBCS — SIGNIFICANT CHANGE UP (ref 0–0)
PLATELET # BLD AUTO: 351 K/UL — SIGNIFICANT CHANGE UP (ref 150–400)
PLATELET # BLD AUTO: 369 K/UL — SIGNIFICANT CHANGE UP (ref 150–400)
PMV BLD: 8.6 FL — SIGNIFICANT CHANGE UP (ref 7–13)
PMV BLD: 8.8 FL — SIGNIFICANT CHANGE UP (ref 7–13)
POTASSIUM SERPL-MCNC: 3.6 MMOL/L — SIGNIFICANT CHANGE UP (ref 3.5–5.3)
POTASSIUM SERPL-MCNC: 4 MMOL/L — SIGNIFICANT CHANGE UP (ref 3.5–5.3)
POTASSIUM SERPL-SCNC: 3.6 MMOL/L — SIGNIFICANT CHANGE UP (ref 3.5–5.3)
POTASSIUM SERPL-SCNC: 4 MMOL/L — SIGNIFICANT CHANGE UP (ref 3.5–5.3)
PROT SERPL-MCNC: 7.7 GM/DL — SIGNIFICANT CHANGE UP (ref 6–8.3)
PROT SERPL-MCNC: 7.8 GM/DL — SIGNIFICANT CHANGE UP (ref 6–8.3)
PROTHROM AB SERPL-ACNC: 14.4 SEC — HIGH (ref 9.9–13.4)
RBC # BLD: 4.48 M/UL — SIGNIFICANT CHANGE UP (ref 4.2–5.8)
RBC # BLD: 4.53 M/UL — SIGNIFICANT CHANGE UP (ref 4.2–5.8)
RBC # FLD: 11.9 % — SIGNIFICANT CHANGE UP (ref 10.3–14.5)
RBC # FLD: 12 % — SIGNIFICANT CHANGE UP (ref 10.3–14.5)
SODIUM SERPL-SCNC: 133 MMOL/L — LOW (ref 135–145)
SODIUM SERPL-SCNC: 133 MMOL/L — LOW (ref 135–145)
WBC # BLD: 11.05 K/UL — HIGH (ref 3.8–10.5)
WBC # BLD: 11.92 K/UL — HIGH (ref 3.8–10.5)
WBC # FLD AUTO: 11.05 K/UL — HIGH (ref 3.8–10.5)
WBC # FLD AUTO: 11.92 K/UL — HIGH (ref 3.8–10.5)

## 2025-03-21 PROCEDURE — 80053 COMPREHEN METABOLIC PANEL: CPT

## 2025-03-21 PROCEDURE — 85610 PROTHROMBIN TIME: CPT

## 2025-03-21 PROCEDURE — 99285 EMERGENCY DEPT VISIT HI MDM: CPT

## 2025-03-21 PROCEDURE — 74177 CT ABD & PELVIS W/CONTRAST: CPT | Mod: 26

## 2025-03-21 PROCEDURE — 85730 THROMBOPLASTIN TIME PARTIAL: CPT

## 2025-03-21 PROCEDURE — 88304 TISSUE EXAM BY PATHOLOGIST: CPT

## 2025-03-21 PROCEDURE — 36415 COLL VENOUS BLD VENIPUNCTURE: CPT

## 2025-03-21 PROCEDURE — 86901 BLOOD TYPING SEROLOGIC RH(D): CPT

## 2025-03-21 PROCEDURE — C1889: CPT

## 2025-03-21 PROCEDURE — 86850 RBC ANTIBODY SCREEN: CPT

## 2025-03-21 PROCEDURE — 85027 COMPLETE CBC AUTOMATED: CPT

## 2025-03-21 PROCEDURE — 88304 TISSUE EXAM BY PATHOLOGIST: CPT | Mod: 26

## 2025-03-21 PROCEDURE — 86900 BLOOD TYPING SEROLOGIC ABO: CPT

## 2025-03-21 PROCEDURE — 44970 LAPAROSCOPY APPENDECTOMY: CPT

## 2025-03-21 RX ORDER — FENTANYL CITRATE-0.9 % NACL/PF 100MCG/2ML
50 SYRINGE (ML) INTRAVENOUS
Refills: 0 | Status: DISCONTINUED | OUTPATIENT
Start: 2025-03-21 | End: 2025-03-21

## 2025-03-21 RX ORDER — OXYCODONE HYDROCHLORIDE 30 MG/1
5 TABLET ORAL ONCE
Refills: 0 | Status: DISCONTINUED | OUTPATIENT
Start: 2025-03-21 | End: 2025-03-21

## 2025-03-21 RX ORDER — ONDANSETRON HCL/PF 4 MG/2 ML
4 VIAL (ML) INJECTION ONCE
Refills: 0 | Status: DISCONTINUED | OUTPATIENT
Start: 2025-03-21 | End: 2025-03-21

## 2025-03-21 RX ORDER — CIPROFLOXACIN HCL 250 MG
1 TABLET ORAL
Qty: 14 | Refills: 0
Start: 2025-03-21 | End: 2025-03-27

## 2025-03-21 RX ORDER — CIPROFLOXACIN HCL 250 MG
400 TABLET ORAL EVERY 12 HOURS
Refills: 0 | Status: DISCONTINUED | OUTPATIENT
Start: 2025-03-22 | End: 2025-03-21

## 2025-03-21 RX ORDER — METRONIDAZOLE 250 MG
500 TABLET ORAL ONCE
Refills: 0 | Status: DISCONTINUED | OUTPATIENT
Start: 2025-03-21 | End: 2025-03-21

## 2025-03-21 RX ORDER — CIPROFLOXACIN HCL 250 MG
400 TABLET ORAL ONCE
Refills: 0 | Status: DISCONTINUED | OUTPATIENT
Start: 2025-03-21 | End: 2025-03-21

## 2025-03-21 RX ORDER — ONDANSETRON HCL/PF 4 MG/2 ML
4 VIAL (ML) INJECTION ONCE
Refills: 0 | Status: COMPLETED | OUTPATIENT
Start: 2025-03-21 | End: 2025-03-21

## 2025-03-21 RX ORDER — OXYCODONE HYDROCHLORIDE AND ACETAMINOPHEN 10; 325 MG/1; MG/1
1 TABLET ORAL
Qty: 20 | Refills: 0
Start: 2025-03-21

## 2025-03-21 RX ORDER — ACETAMINOPHEN 500 MG/5ML
1000 LIQUID (ML) ORAL ONCE
Refills: 0 | Status: COMPLETED | OUTPATIENT
Start: 2025-03-21 | End: 2025-03-21

## 2025-03-21 RX ORDER — SODIUM CHLORIDE 9 G/1000ML
1000 INJECTION, SOLUTION INTRAVENOUS
Refills: 0 | Status: DISCONTINUED | OUTPATIENT
Start: 2025-03-21 | End: 2025-03-21

## 2025-03-21 RX ORDER — METRONIDAZOLE 250 MG
500 TABLET ORAL EVERY 8 HOURS
Refills: 0 | Status: DISCONTINUED | OUTPATIENT
Start: 2025-03-21 | End: 2025-03-21

## 2025-03-21 RX ORDER — BUPIVACAINE 13.3 MG/ML
20 INJECTION, SUSPENSION, LIPOSOMAL INFILTRATION ONCE
Refills: 0 | Status: DISCONTINUED | OUTPATIENT
Start: 2025-03-21 | End: 2025-03-21

## 2025-03-21 RX ADMIN — Medication 50 MICROGRAM(S): at 21:25

## 2025-03-21 RX ADMIN — Medication 50 MICROGRAM(S): at 20:47

## 2025-03-21 RX ADMIN — Medication 50 MICROGRAM(S): at 21:15

## 2025-03-21 RX ADMIN — Medication 50 MICROGRAM(S): at 21:12

## 2025-03-21 RX ADMIN — Medication 1000 MILLILITER(S): at 13:43

## 2025-03-21 RX ADMIN — Medication 50 MICROGRAM(S): at 20:06

## 2025-03-21 RX ADMIN — Medication 4 MILLIGRAM(S): at 13:43

## 2025-03-21 RX ADMIN — OXYCODONE HYDROCHLORIDE 5 MILLIGRAM(S): 30 TABLET ORAL at 20:06

## 2025-03-21 RX ADMIN — OXYCODONE HYDROCHLORIDE 5 MILLIGRAM(S): 30 TABLET ORAL at 21:12

## 2025-03-21 RX ADMIN — OXYCODONE HYDROCHLORIDE 5 MILLIGRAM(S): 30 TABLET ORAL at 21:15

## 2025-03-21 RX ADMIN — OXYCODONE HYDROCHLORIDE 5 MILLIGRAM(S): 30 TABLET ORAL at 21:24

## 2025-03-21 NOTE — ASU DISCHARGE PLAN (ADULT/PEDIATRIC) - FINANCIAL ASSISTANCE
[facility] provides services at a reduced cost to those who are determined to be eligible through [facility]’s financial assistance program. Information regarding [facility]’s financial assistance program can be found by going to [email or web address] or by calling [phone].

## 2025-03-21 NOTE — H&P ADULT - ASSESSMENT
18 yo M with acute appendicitis    - Admit under Dr. Rankin  - NPO  - ivf  - iv abx  - preop for lap appe today

## 2025-03-21 NOTE — H&P ADULT - HISTORY OF PRESENT ILLNESS
18 yo M, PMHx UC (on Remicade) presenting with 1 day abdominal pain. Pain is mostly in RLQ associated with n/v. Pt denies fever, chills, sob, chest pain.  CT done as outpatient concerning for acute appendicitis

## 2025-03-21 NOTE — CONSULT NOTE ADULT - SUBJECTIVE AND OBJECTIVE BOX
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 father who is a cardiologist has requested me for his surgery. patient has elevated wbc, abdominal pain for 24 hours. ctt shows enlarged appendix with fluid and inflammation consistent with appendicitis. recommend a laparoscopic appendectomy. discussed risk and benefits including but not limited to anethesia, bleeding, infections, sepsis, dvt, pe, mi, hernias, need for reoperations and death. patient and parents wwish to proceed.

## 2025-03-21 NOTE — ED STATDOCS - CLINICAL SUMMARY MEDICAL DECISION MAKING FREE TEXT BOX
19-year-old male presents to the emergency department with right lower abdominal pain.  Patient states he had some lightheadedness and nausea yesterday went to a emergency department by his college and had a CT scan which showed possible early tip appendicitis appendix with 7 mm in diameter.  Patient's father is a cardiologist in the [place] area spoke with colorectal team here and was told to come in for evaluation.  Upon arrival surgical team at bedside.  Recommending repeat CT scan.  Patient with minimal pain at this time.  When he does have some discomfort in the right lower quadrant.  No chest pain shortness of breath or other symptoms.  Exam is nonfocal will check labs CT as per surgery symptom control reassess.

## 2025-03-21 NOTE — ASU DISCHARGE PLAN (ADULT/PEDIATRIC) - CARE PROVIDER_API CALL
Toño Rankin  Colon/Rectal Surgery  [address], [facility] [place] [address]  Phone: [phone]  Fax: [phone]  Follow Up Time: 2 weeks

## 2025-03-21 NOTE — ASU DISCHARGE PLAN (ADULT/PEDIATRIC) - NS MD DC FALL RISK RISK
For information on Fall & Injury Prevention, visit: [email or web address] OR  [email or web address] OR  https://www.cdc.gov/steadi/patient.html

## 2025-03-21 NOTE — ED STATDOCS - OBJECTIVE STATEMENT
19-year-old male presents to the emergency department with right lower abdominal pain.  Patient states he had some lightheadedness and nausea yesterday went to a emergency department by his college and had a CT scan which showed possible early tip appendicitis appendix with 7 mm in diameter.  Patient's father is a cardiologist in the [place] area spoke with colorectal team here and was told to come in for evaluation.  Upon arrival surgical team at bedside.  Recommending repeat CT scan.  Patient with minimal pain at this time.  When he does have some discomfort in the right lower quadrant.  No chest pain shortness of breath or other symptoms.

## 2025-03-21 NOTE — ED ADULT TRIAGE NOTE - CHIEF COMPLAINT QUOTE
Pt presents to ER c/o abd pain, nausea and vomiting. Onset of symptoms began yesterday. Pt received CT yesterday that was suspicious for appendicitis. Pt received dose of Cipro/Flagyl  yesterday.

## 2025-03-21 NOTE — ED ADULT NURSE NOTE - OBJECTIVE STATEMENT
19-year-old male presents to the emergency department with right lower abdominal pain.  Patient states he had some lightheadedness and nausea yesterday went to a emergency department by his college and had a CT scan which showed possible early tip appendicitis appendix with 7 mm in diameter.  Patient's father is a cardiologist in the [place] area spoke with colorectal team here and was told to come in for evaluation.  Upon arrival surgical team at bedside.  Recommending repeat CT scan.  Patient with minimal pain at this time.  When he does have some discomfort in the right lower quadrant.  No chest pain shortness of breath or other symptoms.

## 2025-03-21 NOTE — ED ADULT NURSE NOTE - NS ED NURSE LEVEL OF CONSCIOUSNESS SPEECH
CC:  Chris King   Chief Complaint   Patient presents with   • Office Visit     right knee pain      Referring MD: Ming Trimble MD  PCP: Andrade Miranda MD  Medications: medications verified and updated  Refills needed today?  NO  denies known Latex allergy or symptoms of Latex sensitivity.  Patient would like communication of their results via:  [employer]  Tobacco history: verified  BMI: There is no height or weight on file to calculate BMI.                    Speaking Coherently

## 2025-03-21 NOTE — ED STATDOCS - PROGRESS NOTE DETAILS
pt seen with ER attending for pt c/o vomiting and rlq abdo pain for the past 2 days was seen in ER by school CT suspicious for AP sent home seen by Dr Betancourt sent to ER for evaluation and surgical consult    Pt looks well in NAD abdo soft only slight RLQ tenderness no rebound no CVA tenderness      will send labs repeat Ct with oral contrast discussed the results witht he patient and the family of acute appendicis

## 2025-03-21 NOTE — ED STATDOCS - ATTENDING APP SHARED VISIT CONTRIBUTION OF CARE
I, Toño Coburn MD, personally saw the patient with ACP.  I have personally performed a face to face diagnostic evaluation on this patient.   The initial assessment was performed by myself and then the patient was handed off to the ACP. The patient was followed and re-evaluated by the ACP. All labs, imaging and procedures were evaluated and performed by the ACP and I was available for consultation if any questions in the patients care came up.   I personally made/approved the management plan and take responsibility for the patient management.

## 2025-03-21 NOTE — ASU DISCHARGE PLAN (ADULT/PEDIATRIC) - ASU DC SPECIAL INSTRUCTIONSFT
Do not lift weights heavier than 15 lbs for the next 4 weeks  Followup outpatient with Dr Rankin in 2 weeks  You have been prescribed pain medications and antibiotics. Please take them as prescribed.   Your surgical incisions are covered with surgical glue. Please do not remove them, it Will fall off on its own. You can shower in 24 hours with soap and water and pat the surgical areas dry.

## 2025-03-21 NOTE — H&P ADULT - NSHPPHYSICALEXAM_GEN_ALL_CORE
Gen AAOx3  Cardio: NSR  Resp: no resp difficulties  Abdomen: soft, TTP in RLQ, non-distended, no rebound/guarding

## 2025-03-25 ENCOUNTER — NON-APPOINTMENT (OUTPATIENT)
Age: 20
End: 2025-03-25

## 2025-03-27 LAB — SURGICAL PATHOLOGY STUDY: SIGNIFICANT CHANGE UP

## 2025-06-03 ENCOUNTER — APPOINTMENT (OUTPATIENT)
Dept: GASTROENTEROLOGY | Facility: CLINIC | Age: 20
End: 2025-06-03

## 2025-06-03 VITALS
HEIGHT: 64 IN | RESPIRATION RATE: 16 BRPM | SYSTOLIC BLOOD PRESSURE: 119 MMHG | HEART RATE: 82 BPM | WEIGHT: 115 LBS | BODY MASS INDEX: 19.63 KG/M2 | DIASTOLIC BLOOD PRESSURE: 70 MMHG | OXYGEN SATURATION: 98 %

## 2025-06-03 DIAGNOSIS — K51.90 ULCERATIVE COLITIS, UNSPECIFIED, W/OUT COMPLICATIONS: ICD-10-CM

## 2025-06-03 PROCEDURE — 99214 OFFICE O/P EST MOD 30 MIN: CPT

## 2025-06-03 RX ORDER — SODIUM SULFATE, MAGNESIUM SULFATE, AND POTASSIUM CHLORIDE 17.75; 2.7; 2.25 G/1; G/1; G/1
1479-225-188 TABLET ORAL
Qty: 1 | Refills: 0 | Status: ACTIVE | COMMUNITY
Start: 2025-06-03 | End: 1900-01-01

## 2025-06-04 LAB
HBV CORE IGG+IGM SER QL: NONREACTIVE
HBV SURFACE AB SER QL: NONREACTIVE
HBV SURFACE AG SER QL: NONREACTIVE
VZV AB TITR SER: NEGATIVE
VZV IGG SER IF-ACNC: 0.35 S/CO

## 2025-06-05 LAB
M TB IFN-G BLD-IMP: NEGATIVE
QUANTIFERON TB PLUS MITOGEN MINUS NIL: 5.07 IU/ML
QUANTIFERON TB PLUS NIL: 0.05 IU/ML
QUANTIFERON TB PLUS TB1 MINUS NIL: 0 IU/ML
QUANTIFERON TB PLUS TB2 MINUS NIL: 0 IU/ML

## 2025-07-16 ENCOUNTER — APPOINTMENT (OUTPATIENT)
Dept: GASTROENTEROLOGY | Facility: AMBULATORY MEDICAL SERVICES | Age: 20
End: 2025-07-16

## 2025-07-16 PROCEDURE — 45380 COLONOSCOPY AND BIOPSY: CPT

## 2025-07-22 RX ORDER — MESALAMINE 1000 MG/1
1000 SUPPOSITORY RECTAL
Qty: 1 | Refills: 5 | Status: ACTIVE | COMMUNITY
Start: 2025-07-22 | End: 1900-01-01

## 2025-07-24 ENCOUNTER — NON-APPOINTMENT (OUTPATIENT)
Age: 20
End: 2025-07-24

## 2025-07-25 DIAGNOSIS — K51.90 ULCERATIVE COLITIS, UNSPECIFIED, W/OUT COMPLICATIONS: ICD-10-CM

## 2025-07-25 RX ORDER — MESALAMINE 1.2 G/1
1.2 TABLET, DELAYED RELEASE ORAL DAILY
Qty: 1 | Refills: 2 | Status: DISCONTINUED | COMMUNITY
Start: 2025-07-24 | End: 2025-07-25

## 2025-07-25 RX ORDER — MESALAMINE 0.38 G/1
0.38 CAPSULE, EXTENDED RELEASE ORAL DAILY
Qty: 60 | Refills: 5 | Status: ACTIVE | COMMUNITY
Start: 2025-07-25 | End: 1900-01-01

## 2025-08-15 ENCOUNTER — RX RENEWAL (OUTPATIENT)
Age: 20
End: 2025-08-15